# Patient Record
Sex: FEMALE | Race: WHITE | URBAN - METROPOLITAN AREA
[De-identification: names, ages, dates, MRNs, and addresses within clinical notes are randomized per-mention and may not be internally consistent; named-entity substitution may affect disease eponyms.]

---

## 2020-04-15 ENCOUNTER — APPOINTMENT (OUTPATIENT)
Dept: URBAN - METROPOLITAN AREA SURGERY 18 | Age: 80
Setting detail: DERMATOLOGY
End: 2020-04-16

## 2020-04-15 VITALS — DIASTOLIC BLOOD PRESSURE: 61 MMHG | SYSTOLIC BLOOD PRESSURE: 99 MMHG | HEART RATE: 81 BPM

## 2020-04-15 PROBLEM — C44.311 BASAL CELL CARCINOMA OF SKIN OF NOSE: Status: ACTIVE | Noted: 2020-04-15

## 2020-04-15 PROCEDURE — 17311 MOHS 1 STAGE H/N/HF/G: CPT

## 2020-04-15 PROCEDURE — OTHER MOHS SURGERY: OTHER

## 2020-04-15 PROCEDURE — 17312 MOHS ADDL STAGE: CPT

## 2020-04-15 PROCEDURE — 15260 FTH/GFT FR N/E/E/L 20 SQCM/<: CPT

## 2020-04-15 PROCEDURE — OTHER REFERRAL CORRESPONDENCE: OTHER

## 2020-04-22 ENCOUNTER — APPOINTMENT (OUTPATIENT)
Dept: URBAN - METROPOLITAN AREA SURGERY 18 | Age: 80
Setting detail: DERMATOLOGY
End: 2020-04-24

## 2020-04-22 DIAGNOSIS — Z48.817 ENCOUNTER FOR SURGICAL AFTERCARE FOLLOWING SURGERY ON THE SKIN AND SUBCUTANEOUS TISSUE: ICD-10-CM

## 2020-04-22 PROCEDURE — OTHER POST-OP WOUND CHECK: OTHER

## 2020-04-22 PROCEDURE — 99024 POSTOP FOLLOW-UP VISIT: CPT

## 2020-04-22 ASSESSMENT — LOCATION DETAILED DESCRIPTION DERM: LOCATION DETAILED: NASAL SUPRATIP

## 2020-04-22 ASSESSMENT — LOCATION SIMPLE DESCRIPTION DERM: LOCATION SIMPLE: NOSE

## 2020-04-22 ASSESSMENT — LOCATION ZONE DERM: LOCATION ZONE: NOSE

## 2020-04-22 NOTE — PROCEDURE: POST-OP WOUND CHECK
Wound Evaluated By: Dr. Matti Mendoza
Add 64244 Cpt? (Important Note: In 2017 The Use Of 48848 Is Being Tracked By Cms To Determine Future Global Period Reimbursement For Global Periods): yes
Detail Level: Detailed
Additional Comments: Follow up in one week. Keep new dressing in place for one week. Continue to keep dry for one week.\\n\\nDr. Kelly's note - well healed suture line, well vascularized burow's graft. No signs/symptoms of postop infection, poor wound healing, impending graft necrosis/failure. Continued care reviewed. Follow up in one week.

## 2020-04-29 ENCOUNTER — APPOINTMENT (OUTPATIENT)
Dept: URBAN - METROPOLITAN AREA SURGERY 18 | Age: 80
Setting detail: DERMATOLOGY
End: 2020-04-30

## 2020-04-29 DIAGNOSIS — Z48.817 ENCOUNTER FOR SURGICAL AFTERCARE FOLLOWING SURGERY ON THE SKIN AND SUBCUTANEOUS TISSUE: ICD-10-CM

## 2020-04-29 PROCEDURE — 99024 POSTOP FOLLOW-UP VISIT: CPT

## 2020-04-29 PROCEDURE — OTHER POST-OP WOUND CHECK: OTHER

## 2020-04-29 ASSESSMENT — LOCATION DETAILED DESCRIPTION DERM: LOCATION DETAILED: NASAL SUPRATIP

## 2020-04-29 ASSESSMENT — LOCATION SIMPLE DESCRIPTION DERM: LOCATION SIMPLE: NOSE

## 2020-04-29 ASSESSMENT — LOCATION ZONE DERM: LOCATION ZONE: NOSE

## 2020-04-29 NOTE — PROCEDURE: POST-OP WOUND CHECK
Add 41450 Cpt? (Important Note: In 2017 The Use Of 54735 Is Being Tracked By Cms To Determine Future Global Period Reimbursement For Global Periods): yes
Wound Evaluated By: Dr. Matti Mendoza
Additional Comments: May start to get wet daily and continue daily dressing changes until fully healed. Crusted area around nasal tip (graft site )partially removed by Dr. Mendoza at today’s visit. Graft is well vascularized, continues to mature. Signs/symptoms of appropriate vs poor wound healing reviewed in detail. Continued care reviewed. Follow up PRN (based off community transmission covid), dermabrasion/revision in future if needed (when coronavirus transmission ceases).
Detail Level: Detailed
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
